# Patient Record
Sex: FEMALE | Race: BLACK OR AFRICAN AMERICAN | NOT HISPANIC OR LATINO | URBAN - METROPOLITAN AREA
[De-identification: names, ages, dates, MRNs, and addresses within clinical notes are randomized per-mention and may not be internally consistent; named-entity substitution may affect disease eponyms.]

---

## 2024-05-24 ENCOUNTER — OFFICE VISIT (OUTPATIENT)
Dept: URBAN - METROPOLITAN AREA CLINIC 109 | Facility: CLINIC | Age: 54
End: 2024-05-24

## 2024-06-21 ENCOUNTER — OFFICE VISIT (OUTPATIENT)
Dept: URBAN - METROPOLITAN AREA CLINIC 109 | Facility: CLINIC | Age: 54
End: 2024-06-21
Payer: COMMERCIAL

## 2024-06-21 ENCOUNTER — DASHBOARD ENCOUNTERS (OUTPATIENT)
Age: 54
End: 2024-06-21

## 2024-06-21 VITALS
HEIGHT: 60 IN | DIASTOLIC BLOOD PRESSURE: 100 MMHG | BODY MASS INDEX: 44.25 KG/M2 | WEIGHT: 225.4 LBS | HEART RATE: 84 BPM | SYSTOLIC BLOOD PRESSURE: 193 MMHG

## 2024-06-21 DIAGNOSIS — Z09 ENCOUNTER FOR FOLLOW-UP: ICD-10-CM

## 2024-06-21 DIAGNOSIS — Z86.010 PERSONAL HISTORY OF COLON POLYPS: ICD-10-CM

## 2024-06-21 PROCEDURE — 99203 OFFICE O/P NEW LOW 30 MIN: CPT | Performed by: INTERNAL MEDICINE

## 2024-06-21 RX ORDER — POLYETHYLENE GLYCOL-3350 AND ELECTROLYTES 236; 6.74; 5.86; 2.97; 22.74 G/274.31G; G/274.31G; G/274.31G; G/274.31G; G/274.31G
AS DIRECTED POWDER, FOR SOLUTION ORAL ONCE
Qty: 1 KIT | Refills: 0 | OUTPATIENT

## 2024-06-21 RX ORDER — SEMAGLUTIDE 0.68 MG/ML
INJECT 0.25 MG SUBCUTANEOUSLY ONCE WEEKLY FOR 4 WEEKS. AT WEEK 5, INCREASE TO 0.5 MG ONCE WEEKLY INJECTION, SOLUTION SUBCUTANEOUS
Qty: 3 MILLILITER | Refills: 1 | Status: ACTIVE | COMMUNITY

## 2024-06-21 RX ORDER — HYDROCHLOROTHIAZIDE 12.5 MG/1
TAKE 1 CAPSULE BY MOUTH ONCE DAILY CAPSULE ORAL
Qty: 90 EACH | Refills: 0 | Status: ACTIVE | COMMUNITY

## 2024-06-21 RX ORDER — LISINOPRIL 40 MG/1
TAKE 1 TABLET (40 MG) BY ORAL ROUTE ONCE DAILY TABLET ORAL 1
Qty: 0 | Refills: 0 | Status: ACTIVE | COMMUNITY
Start: 1900-01-01

## 2024-06-21 NOTE — HPI-TODAY'S VISIT:
Patient here for routine colonoscopy evaluation. The patient denies specific GI complaints with no rectal bleeding, abdominal pain, or change in bowel habits  - One 3 mm polyp in the ascending colon, removed with a cold snare. Resected and retrieved. - One 2 mm polyp in the rectum, removed with a cold biopsy forceps. Resected and retrieved. - The examination was otherwise normal.  the asc polyp was a TA, rectal polyp was hyperplastic

## 2024-07-25 ENCOUNTER — OFFICE VISIT (OUTPATIENT)
Dept: URBAN - METROPOLITAN AREA SURGERY CENTER 23 | Facility: SURGERY CENTER | Age: 54
End: 2024-07-25

## 2024-07-25 ENCOUNTER — CLAIMS CREATED FROM THE CLAIM WINDOW (OUTPATIENT)
Dept: URBAN - METROPOLITAN AREA SURGERY CENTER 23 | Facility: SURGERY CENTER | Age: 54
End: 2024-07-25
Payer: COMMERCIAL

## 2024-07-25 DIAGNOSIS — Z12.11 COLON CANCER SCREENING: ICD-10-CM

## 2024-07-25 DIAGNOSIS — Z86.010 PERSONAL HISTORY OF COLONIC POLYPS: ICD-10-CM

## 2024-07-25 PROCEDURE — 00812 ANES LWR INTST SCR COLSC: CPT | Performed by: NURSE ANESTHETIST, CERTIFIED REGISTERED

## 2024-07-25 RX ORDER — LISINOPRIL 40 MG/1
TAKE 1 TABLET (40 MG) BY ORAL ROUTE ONCE DAILY TABLET ORAL 1
Qty: 0 | Refills: 0 | Status: ACTIVE | COMMUNITY
Start: 1900-01-01

## 2024-07-25 RX ORDER — HYDROCHLOROTHIAZIDE 12.5 MG/1
TAKE 1 CAPSULE BY MOUTH ONCE DAILY CAPSULE ORAL
Qty: 90 EACH | Refills: 0 | Status: ACTIVE | COMMUNITY

## 2024-07-25 RX ORDER — SEMAGLUTIDE 0.68 MG/ML
INJECT 0.25 MG SUBCUTANEOUSLY ONCE WEEKLY FOR 4 WEEKS. AT WEEK 5, INCREASE TO 0.5 MG ONCE WEEKLY INJECTION, SOLUTION SUBCUTANEOUS
Qty: 3 MILLILITER | Refills: 1 | Status: ACTIVE | COMMUNITY

## 2024-07-25 RX ORDER — POLYETHYLENE GLYCOL-3350 AND ELECTROLYTES 236; 6.74; 5.86; 2.97; 22.74 G/274.31G; G/274.31G; G/274.31G; G/274.31G; G/274.31G
AS DIRECTED POWDER, FOR SOLUTION ORAL ONCE
Qty: 1 KIT | Refills: 0 | Status: ACTIVE | COMMUNITY

## 2025-07-29 ENCOUNTER — TELEPHONE ENCOUNTER (OUTPATIENT)
Dept: URBAN - METROPOLITAN AREA CLINIC 109 | Facility: CLINIC | Age: 55
End: 2025-07-29